# Patient Record
Sex: MALE | Race: WHITE | NOT HISPANIC OR LATINO
[De-identification: names, ages, dates, MRNs, and addresses within clinical notes are randomized per-mention and may not be internally consistent; named-entity substitution may affect disease eponyms.]

---

## 2018-12-10 PROBLEM — Z00.00 ENCOUNTER FOR PREVENTIVE HEALTH EXAMINATION: Status: ACTIVE | Noted: 2018-12-10

## 2019-01-10 ENCOUNTER — APPOINTMENT (OUTPATIENT)
Dept: OTOLARYNGOLOGY | Facility: CLINIC | Age: 74
End: 2019-01-10
Payer: MEDICARE

## 2019-01-10 VITALS
SYSTOLIC BLOOD PRESSURE: 165 MMHG | DIASTOLIC BLOOD PRESSURE: 87 MMHG | HEART RATE: 52 BPM | BODY MASS INDEX: 36.96 KG/M2 | WEIGHT: 264 LBS | HEIGHT: 71 IN

## 2019-01-10 DIAGNOSIS — Z78.9 OTHER SPECIFIED HEALTH STATUS: ICD-10-CM

## 2019-01-10 DIAGNOSIS — H91.93 UNSPECIFIED HEARING LOSS, BILATERAL: ICD-10-CM

## 2019-01-10 DIAGNOSIS — Z82.2 FAMILY HISTORY OF DEAFNESS AND HEARING LOSS: ICD-10-CM

## 2019-01-10 DIAGNOSIS — H90.42 SENSORINEURAL HEARING LOSS, UNILATERAL, LEFT EAR, WITH UNRESTRICTED HEARING ON THE CONTRALATERAL SIDE: ICD-10-CM

## 2019-01-10 DIAGNOSIS — Z86.39 PERSONAL HISTORY OF OTHER ENDOCRINE, NUTRITIONAL AND METABOLIC DISEASE: ICD-10-CM

## 2019-01-10 DIAGNOSIS — Z80.9 FAMILY HISTORY OF MALIGNANT NEOPLASM, UNSPECIFIED: ICD-10-CM

## 2019-01-10 DIAGNOSIS — Z87.891 PERSONAL HISTORY OF NICOTINE DEPENDENCE: ICD-10-CM

## 2019-01-10 DIAGNOSIS — Z86.79 PERSONAL HISTORY OF OTHER DISEASES OF THE CIRCULATORY SYSTEM: ICD-10-CM

## 2019-01-10 DIAGNOSIS — Z82.49 FAMILY HISTORY OF ISCHEMIC HEART DISEASE AND OTHER DISEASES OF THE CIRCULATORY SYSTEM: ICD-10-CM

## 2019-01-10 DIAGNOSIS — G60.9 HEREDITARY AND IDIOPATHIC NEUROPATHY, UNSPECIFIED: ICD-10-CM

## 2019-01-10 PROCEDURE — 92557 COMPREHENSIVE HEARING TEST: CPT

## 2019-01-10 PROCEDURE — 99204 OFFICE O/P NEW MOD 45 MIN: CPT | Mod: 25

## 2019-01-10 PROCEDURE — 92567 TYMPANOMETRY: CPT

## 2019-01-10 RX ORDER — PANTOPRAZOLE 40 MG/1
40 TABLET, DELAYED RELEASE ORAL
Refills: 0 | Status: ACTIVE | COMMUNITY

## 2019-01-10 RX ORDER — FLUOXETINE HYDROCHLORIDE 10 MG/1
10 TABLET ORAL
Refills: 0 | Status: ACTIVE | COMMUNITY

## 2019-01-10 RX ORDER — TRAZODONE HYDROCHLORIDE 300 MG/1
TABLET ORAL
Refills: 0 | Status: ACTIVE | COMMUNITY

## 2019-01-10 RX ORDER — FUROSEMIDE 40 MG/1
40 TABLET ORAL
Refills: 0 | Status: ACTIVE | COMMUNITY

## 2019-01-10 NOTE — REASON FOR VISIT
[Initial Evaluation] : an initial evaluation for [Spouse] : spouse [FreeTextEntry2] : here for bilateral hearing loss

## 2019-01-10 NOTE — REVIEW OF SYSTEMS
[Sneezing] : sneezing [Seasonal Allergies] : seasonal allergies [Ear Pain] : ear pain [Ear Itch] : ear itch [Hearing Loss] : hearing loss [Recurrent Ear Infections] : recurrent ear infections [Ear Drainage] : ear drainage [Ear Noises] : ear noises [Nasal Congestion] : nasal congestion [Sinus Pain] : sinus pain [Sinus Pressure] : sinus pressure [Snoring With Pauses] : snoring with pauses [Throat Clearing] : throat clearing [Dry Eyes] : dry eyes [Eyes Itch] : itching of the eyes [Shortness Of Breath] : shortness of breath [Cough] : cough [Negative] : Heme/Lymph

## 2019-01-10 NOTE — PHYSICAL EXAM
[Midline] : trachea located in midline position [Normal] : no rashes [de-identified] : wax removed AS - TMs normal

## 2019-01-10 NOTE — HISTORY OF PRESENT ILLNESS
[de-identified] : 73 year old male here for bilateral hearing loss.  States wearing bilateral hearing aids for about 20 years.  States ear infections, more often on the left than the right, usually 5-6 per year - most recently several months ago.  Treated usually with oral antibiotics, sometimes with ear drop as as well.  States intermittent tinnitus, usually the right ear.   Patient denies otalgia, otorrhea, dizziness, vertigo, headaches related to hearing.  Sees Dr. Avni Paz.  Mother also had hearing loss.  Left ear worse. \par

## 2019-02-13 ENCOUNTER — APPOINTMENT (OUTPATIENT)
Dept: SPEECH THERAPY | Facility: CLINIC | Age: 74
End: 2019-02-13

## 2019-02-13 ENCOUNTER — OUTPATIENT (OUTPATIENT)
Dept: OUTPATIENT SERVICES | Facility: HOSPITAL | Age: 74
LOS: 1 days | Discharge: HOME | End: 2019-02-13

## 2019-02-15 DIAGNOSIS — H90.5 UNSPECIFIED SENSORINEURAL HEARING LOSS: ICD-10-CM

## 2023-03-28 ENCOUNTER — APPOINTMENT (OUTPATIENT)
Dept: ORTHOPEDIC SURGERY | Facility: CLINIC | Age: 78
End: 2023-03-28
Payer: MEDICARE

## 2023-03-28 VITALS — WEIGHT: 264 LBS | HEIGHT: 71 IN | BODY MASS INDEX: 36.96 KG/M2

## 2023-03-28 DIAGNOSIS — M54.2 CERVICALGIA: ICD-10-CM

## 2023-03-28 PROCEDURE — 99204 OFFICE O/P NEW MOD 45 MIN: CPT

## 2023-03-28 NOTE — IMAGING
[de-identified] : Cervical Spine:\par \par Inspection/Palpation\par No tenderness to palpation throughout Cervical. \par No bony step offs, No lesions.\par \par Gait:\par Non-antalgic, able to perform bilateral toe and heel rise. \par Able to perform tandem gait. \par \par Range of Motion: \par Flexion to chin to chest, extension to 70 degrees, rotation 90 degrees bilaterally, Lateral flexion to 45 degrees bilaterally\par \par Neurologic:\par Bilateral upper extremities 5/5 Deltoid/Biceps/Triceps/ Wrist Flexion/Wrist Extension/ / Intrinsics \par \par Sensation intact to light touch C5-T1\par \par Biceps/Triceps/Brachioradialis Reflex within normal limits\par \par Negative Moraeu's, No inverted brachioradials reflex\par \par \par X-ray Ap/Lateral of cervical spine were viewed and interpreted from [facility] .  loss of lorodiss. Multilevel degenerative cahnges.  No spondylolisthesis.

## 2023-03-28 NOTE — HISTORY OF PRESENT ILLNESS
[de-identified] : The patient is a 77 year old male who presents today complaining of neck pain.  \par Date of Injury/Onset: 5-10+ years\par Pain:    At Rest: 5/10 \par With Activity:  10/10 \par Mechanism of injury: Insidious\par Quality of symptoms: Tightness/ Aching \par Improves with: Meds\par Worse with: _\par Prior treatment/ Imaging: PCP prescribed steroids, muscle relaxers/ XR @ [facility]\par Occupation: Retired\par Additional Information: None\par \par 78 y/o male here for evaluation cervical spine. 4 weeks ago patient had stiffness and left head pain. No injury. Saw his PMD who placed ordered an xray and placed him on Medrol, muscle relaxers and NSAIDs. He symptoms have fully resolved. No PT, chiro, acupuncture, injection yet. No c/o numbness or paraesthesias. No bowel or bladder incontinence. No gait disturbance.\par \par History of cervical spine pain 15 years ago, treated with PT and fully resolved. \par \par

## 2023-03-28 NOTE — ASSESSMENT
[FreeTextEntry1] : 77 M with axial neck pain and muscle spasm that is resolving. \par PT\par nsaids OTC PRN\par FU PRN

## 2024-01-03 ENCOUNTER — APPOINTMENT (OUTPATIENT)
Dept: ORTHOPEDIC SURGERY | Facility: CLINIC | Age: 79
End: 2024-01-03
Payer: MEDICARE

## 2024-01-03 DIAGNOSIS — R29.898 OTHER SYMPTOMS AND SIGNS INVOLVING THE MUSCULOSKELETAL SYSTEM: ICD-10-CM

## 2024-01-03 DIAGNOSIS — M76.61 ACHILLES TENDINITIS, RIGHT LEG: ICD-10-CM

## 2024-01-03 DIAGNOSIS — G60.0 HEREDITARY MOTOR AND SENSORY NEUROPATHY: ICD-10-CM

## 2024-01-03 PROCEDURE — 73600 X-RAY EXAM OF ANKLE: CPT | Mod: RT

## 2024-01-03 PROCEDURE — 73630 X-RAY EXAM OF FOOT: CPT | Mod: RT

## 2024-01-03 PROCEDURE — 99214 OFFICE O/P EST MOD 30 MIN: CPT

## 2024-01-03 NOTE — HISTORY OF PRESENT ILLNESS
[6] : 6 [3] : 3 [Sharp] : sharp [de-identified] : Pt is a 78 year old male who presents today for evaluation of his right ankle. Pt states that his posterior ankle since November 2023. Was seeing a podiatrist, gave him CSI mid of November. Helped a little bit then it flared up again, received another CSI beginning of December 2023. Pain localized along the posterior ankle.  No other treatment to date aside from icing.  Denies trauma/previous injury. A little numbness/tingling. Hx of Charcot-Pauly-Tooth disease.  Ice to affected area. He has been taking meloxicam, but does not feel it is helping. No formal treatment to date.  WB in sneakers.  [] : no [FreeTextEntry1] : Right ankle

## 2024-01-03 NOTE — PHYSICAL EXAM
[NL (40)] : plantar flexion 40 degrees [NL 30)] : inversion 30 degrees [NL (20)] : eversion 20 degrees [4___] : eversion 4[unfilled]/5 [2+] : posterior tibialis pulse: 2+ [] : non-antalgic [Right] : right foot [Weight -] : weightbearing [There are no fractures, subluxations or dislocations. No significant abnormalities are seen] : There are no fractures, subluxations or dislocations. No significant abnormalities are seen [FreeTextEntry8] : Thickening in the achilles tendon watershed area. [de-identified] : Diffused decreased sensation in the forefoot. [de-identified] : Achilles insertional spur [TWNoteComboBox7] : dorsiflexion 10 degrees

## 2024-01-03 NOTE — DISCUSSION/SUMMARY
[de-identified] : Patient will start PT. He will continue with his custom orthotics and the meloxicam. Icing daily.  The patient was explained the options as well as benefits of over the counter verses prescription strength nonsteroidal anti-inflammatory medication. The patient opts for a prescription strength medication.

## 2024-01-23 ENCOUNTER — OFFICE (OUTPATIENT)
Dept: URBAN - METROPOLITAN AREA CLINIC 12 | Facility: CLINIC | Age: 79
Setting detail: OPHTHALMOLOGY
End: 2024-01-23
Payer: COMMERCIAL

## 2024-01-23 DIAGNOSIS — H35.033: ICD-10-CM

## 2024-01-23 DIAGNOSIS — H25.13: ICD-10-CM

## 2024-01-23 DIAGNOSIS — H02.403: ICD-10-CM

## 2024-01-23 DIAGNOSIS — H43.393: ICD-10-CM

## 2024-01-23 DIAGNOSIS — H47.012: ICD-10-CM

## 2024-01-23 PROBLEM — H02.821 LID LESION; RIGHT UPPER LID: Status: ACTIVE | Noted: 2024-01-23

## 2024-01-23 PROCEDURE — 92133 CPTRZD OPH DX IMG PST SGM ON: CPT | Performed by: OPHTHALMOLOGY

## 2024-01-23 PROCEDURE — 99214 OFFICE O/P EST MOD 30 MIN: CPT | Performed by: OPHTHALMOLOGY

## 2024-01-23 ASSESSMENT — REFRACTION_CURRENTRX
OD_AXIS: 092
OS_VPRISM_DIRECTION: PROGS
OS_SPHERE: +2.75
OS_ADD: +2.25
OS_CYLINDER: -0.50
OS_SPHERE: +2.50
OD_SPHERE: +2.75
OD_CYLINDER: -0.50
OD_ADD: +2.25
OD_ADD: +2.50
OS_AXIS: 073
OD_VPRISM_DIRECTION: PROGS
OD_CYLINDER: -0.25
OD_AXIS: 089
OD_SPHERE: +2.50
OS_CYLINDER: -0.75
OS_ADD: +2.50
OS_AXIS: 067
OD_OVR_VA: 20/
OS_VPRISM_DIRECTION: PROGS
OD_OVR_VA: 20/
OS_OVR_VA: 20/
OD_VPRISM_DIRECTION: PROGS
OS_OVR_VA: 20/

## 2024-01-23 ASSESSMENT — LID POSITION - PTOSIS
OD_PTOSIS: RUL 2+ 3+
OS_PTOSIS: LUL 2+ 3+

## 2024-01-23 ASSESSMENT — REFRACTION_AUTOREFRACTION
OD_CYLINDER: -1.00
OS_CYLINDER: -1.25
OD_SPHERE: +2.75
OS_SPHERE: +2.75
OS_AXIS: 071
OD_AXIS: 102

## 2024-01-23 ASSESSMENT — REFRACTION_MANIFEST
OD_AXIS: 100
OS_AXIS: 080
OD_SPHERE: +2.25
OD_SPHERE: +2.75
OS_VA1: 20/20
OS_ADD: +2.50
OS_CYLINDER: -1.00
OD_ADD: +2.50
OD_CYLINDER: -0.75
OS_SPHERE: +2.75
OS_SPHERE: +2.75
OD_CYLINDER: -0.25
OS_ADD: +2.50
OS_AXIS: 070
OS_CYLINDER: -1.00
OD_VA1: 20/25+2
OD_AXIS: 098
OD_ADD: +2.50

## 2024-01-23 ASSESSMENT — CONFRONTATIONAL VISUAL FIELD TEST (CVF)
OS_FINDINGS: FULL
OD_FINDINGS: FULL

## 2024-01-23 ASSESSMENT — SPHEQUIV_DERIVED
OS_SPHEQUIV: 2.125
OS_SPHEQUIV: 2.25
OD_SPHEQUIV: 2.25
OS_SPHEQUIV: 2.25
OD_SPHEQUIV: 2.375
OD_SPHEQUIV: 2.125

## 2024-02-12 ENCOUNTER — RX ONLY (RX ONLY)
Age: 79
End: 2024-02-12

## 2024-02-12 ENCOUNTER — OFFICE (OUTPATIENT)
Facility: LOCATION | Age: 79
Setting detail: OPHTHALMOLOGY
End: 2024-02-12
Payer: COMMERCIAL

## 2024-02-12 DIAGNOSIS — H02.821: ICD-10-CM

## 2024-02-12 PROBLEM — H02.40 PTOSIS OF EYELID, UNSPECIFIED; BOTH EYES: Status: ACTIVE | Noted: 2024-02-12

## 2024-02-12 PROCEDURE — 92285 EXTERNAL OCULAR PHOTOGRAPHY: CPT | Performed by: OPHTHALMOLOGY

## 2024-02-12 PROCEDURE — 67840 REMOVE EYELID LESION: CPT | Mod: E3 | Performed by: OPHTHALMOLOGY

## 2024-02-12 ASSESSMENT — SPHEQUIV_DERIVED
OS_SPHEQUIV: 2.125
OD_SPHEQUIV: 2.25

## 2024-02-12 ASSESSMENT — REFRACTION_AUTOREFRACTION
OS_CYLINDER: -1.25
OD_SPHERE: +2.75
OS_SPHERE: +2.75
OS_AXIS: 071
OD_AXIS: 102
OD_CYLINDER: -1.00

## 2024-02-12 ASSESSMENT — LID POSITION - PTOSIS
OS_PTOSIS: LUL 2+ 3+
OD_PTOSIS: RUL 2+ 3+

## 2024-02-12 ASSESSMENT — CONFRONTATIONAL VISUAL FIELD TEST (CVF)
OD_FINDINGS: FULL
OS_FINDINGS: FULL

## 2024-02-21 ENCOUNTER — APPOINTMENT (OUTPATIENT)
Dept: ORTHOPEDIC SURGERY | Facility: CLINIC | Age: 79
End: 2024-02-21
Payer: MEDICARE

## 2024-02-21 ENCOUNTER — APPOINTMENT (OUTPATIENT)
Dept: MRI IMAGING | Facility: CLINIC | Age: 79
End: 2024-02-21
Payer: MEDICARE

## 2024-02-21 DIAGNOSIS — S86.011A STRAIN OF RIGHT ACHILLES TENDON, INITIAL ENCOUNTER: ICD-10-CM

## 2024-02-21 PROCEDURE — 73721 MRI JNT OF LWR EXTRE W/O DYE: CPT | Mod: RT

## 2024-02-21 PROCEDURE — L4361: CPT | Mod: RT

## 2024-02-21 PROCEDURE — 99214 OFFICE O/P EST MOD 30 MIN: CPT

## 2024-02-21 NOTE — HISTORY OF PRESENT ILLNESS
[6] : 6 [3] : 3 [Sharp] : sharp [de-identified] : Pt. is a 78 year old male who presents today for follow up of his RT ankle. Was seeing a podiatrist, gave him CSI mid November 2023. Helped a little bit then it flared up again, received another CSI beginning of December 2023. Pain localized along the posterior ankle.  No other treatment to date aside from icing. History of Charcot-Pauly-Tooth disease. Ice to affected area. He has been taking meloxicam, but does not feel it is helping. WB in sneakers. He reports feeling a pop and developing worsening pain 2/17/24.  [] : no [FreeTextEntry1] : Right ankle

## 2024-02-21 NOTE — PHYSICAL EXAM
[NL (40)] : plantar flexion 40 degrees [NL 30)] : inversion 30 degrees [NL (20)] : eversion 20 degrees [4___] : eversion 4[unfilled]/5 [2+] : posterior tibialis pulse: 2+ [Right] : right foot [Weight -] : weightbearing [There are no fractures, subluxations or dislocations. No significant abnormalities are seen] : There are no fractures, subluxations or dislocations. No significant abnormalities are seen [de-identified] : Achilles insertional spur [Moderate] : moderate diffused ankle swelling [3___] : plantar flexion 3[unfilled]/5 [] : antalgic [FreeTextEntry8] : Palpable gap Achilles tendon [de-identified] : Diffused decreased sensation in the forefoot. [TWNoteComboBox7] : dorsiflexion 10 degrees

## 2024-02-21 NOTE — DISCUSSION/SUMMARY
[de-identified] : The patient was explained that they have a rupture of the Achilles tendon. An MRI of the ankle was ordered to confirm the diagnosis and rule out any other pathology. Both nonoperative and operative treatment options and associated risks and benefits were discussed with the patient. The patient was explained that both options can be successful.  Nonoperative treatment can take longer to return to full activities, and can be associated with a higher re-rupture rate. The most common complication with surgery is with the incision site having some sort of breakdown and/or infection. The patient has opted for surgical intervention.  NWB in CAM boot with heel lifts is recommended.   Patient was instructed that they can not operate an automatic vehicle while wearing a CAM boot or cast on the right lower extremity. If operating a vehicle that requires use of a clutch, patient may not drive while wearing a CAM boot or cast on the left lower extremity.

## 2024-02-23 ENCOUNTER — NON-APPOINTMENT (OUTPATIENT)
Age: 79
End: 2024-02-23

## 2024-03-13 ENCOUNTER — OFFICE (OUTPATIENT)
Dept: URBAN - METROPOLITAN AREA CLINIC 111 | Facility: CLINIC | Age: 79
Setting detail: OPHTHALMOLOGY
End: 2024-03-13
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
## 2024-03-13 PROCEDURE — NO SHOW FE NO SHOW FEE: Performed by: OPHTHALMOLOGY

## 2024-05-01 ENCOUNTER — APPOINTMENT (OUTPATIENT)
Dept: ORTHOPEDIC SURGERY | Facility: CLINIC | Age: 79
End: 2024-05-01

## 2024-05-20 ENCOUNTER — NON-APPOINTMENT (OUTPATIENT)
Age: 79
End: 2024-05-20

## 2024-05-20 DIAGNOSIS — L85.3 XEROSIS CUTIS: ICD-10-CM

## 2024-05-20 DIAGNOSIS — Q66.6 OTHER CONGENITAL VALGUS DEFORMITIES OF FEET: ICD-10-CM

## 2024-05-20 DIAGNOSIS — L60.0 INGROWING NAIL: ICD-10-CM

## 2024-05-20 DIAGNOSIS — B35.1 TINEA UNGUIUM: ICD-10-CM

## 2024-05-20 RX ORDER — METOPROLOL TARTRATE 25 MG/1
25 TABLET, FILM COATED ORAL
Refills: 0 | Status: ACTIVE | COMMUNITY

## 2024-05-20 RX ORDER — L-METHYLFOLATE-ALGAE-VIT B12-B6 CAP 3-90.314-2-35 MG 3-90.314-2-35 MG
3-90.314-2-35 CAP ORAL
Refills: 0 | Status: ACTIVE | COMMUNITY

## 2024-05-20 RX ORDER — LEVOMEFOLATE/B6/B12/ALGAL OIL 3-35-2 MG
3-90.314-2-35 CAPSULE ORAL
Refills: 0 | Status: ACTIVE | COMMUNITY

## 2024-05-20 RX ORDER — BUSPIRONE HYDROCHLORIDE 10 MG/1
10 TABLET ORAL
Refills: 0 | Status: ACTIVE | COMMUNITY

## 2024-05-20 RX ORDER — ENALAPRIL MALEATE 10 MG/1
10 TABLET ORAL
Refills: 0 | Status: ACTIVE | COMMUNITY

## 2024-05-20 RX ORDER — ATORVASTATIN CALCIUM 40 MG/1
40 TABLET, FILM COATED ORAL
Refills: 0 | Status: ACTIVE | COMMUNITY

## 2024-05-20 RX ORDER — MELOXICAM 15 MG/1
15 TABLET ORAL
Refills: 0 | Status: ACTIVE | COMMUNITY

## 2024-05-21 ENCOUNTER — APPOINTMENT (OUTPATIENT)
Dept: PODIATRY | Facility: CLINIC | Age: 79
End: 2024-05-21
Payer: MEDICARE

## 2024-05-21 PROCEDURE — 11721 DEBRIDE NAIL 6 OR MORE: CPT

## 2024-05-25 NOTE — HISTORY OF PRESENT ILLNESS
[FreeTextEntry1] : The patient returns to the office with a chief complaint of tenderness and pain on the toenails of toes 1, 2 3, 4 and 5 left foot and 1, 2, 3, 4 and 5 right foot.  The patient states that when he walks the pain gets worse.  Certain shoes are more bothersome than others. An updated medical history did not reveal any changes.

## 2024-05-25 NOTE — PHYSICAL EXAM
[TextEntry] : On toes 1, 2 3, 4 and 5 left foot and 1, 2, 3, 4 and 5 right foot the toenails exhibited a yellowish discoloration with thickening.  Upon palpation of the toenails increased pain was elicited. The vascular, orthopedic and dermatological status of each foot was otherwise unchanged.

## 2024-05-25 NOTE — ASSESSMENT
[FreeTextEntry1] : Assessment: Onychomycosis caused by T. Rubrum.  Plan: The toenails 1 - 5 on both feet were debrided mechanically and then were electrically burred to a more normal appearance to reduce the fungal load and allow the antifungal medication to work more effectively.   All toenails were trimmed with a 14 cm sterile stainless steel box lock double spring nail splitter.  Then utilizing a sterile pear shaped ezequiel ritu (this device falls under bur, surgical, general & plastic surgery.  The FDA deems this item a Class-1 device) via a 35,000 RPM electric drill and vacuum and dust extractor system all toenails were aseptically debrided removing fungal layers.  This is done to diminish the fungal load of the toenails and enhance the effects of the antifungal medication, allowing overall improvement in the degree of fungal infection as well as improve appearance and reduce discomfort and help diminish chances of secondary bacterial infection, also lessening the chance of ingrown nails, especially when performed on a regular basis.  The patient was encouraged to continue with the topical antifungal medication everyday and use the Clean Sweep antifungal spray to disinfect their shoes. He was advised to call the office at any time with any problems or questions.  PTR: 2 months.

## 2024-07-02 ENCOUNTER — APPOINTMENT (OUTPATIENT)
Dept: PODIATRY | Facility: CLINIC | Age: 79
End: 2024-07-02

## 2024-07-02 DIAGNOSIS — L08.9 LOCAL INFECTION OF THE SKIN AND SUBCUTANEOUS TISSUE, UNSPECIFIED: ICD-10-CM

## 2024-07-02 PROCEDURE — 10060 I&D ABSCESS SIMPLE/SINGLE: CPT

## 2024-07-02 RX ORDER — MUPIROCIN 20 MG/G
2 OINTMENT TOPICAL
Qty: 22 | Refills: 2 | Status: ACTIVE | COMMUNITY
Start: 2024-07-02 | End: 1900-01-01

## 2024-07-23 ENCOUNTER — APPOINTMENT (OUTPATIENT)
Dept: PODIATRY | Facility: CLINIC | Age: 79
End: 2024-07-23

## 2024-07-30 ENCOUNTER — APPOINTMENT (OUTPATIENT)
Dept: PODIATRY | Facility: CLINIC | Age: 79
End: 2024-07-30
Payer: MEDICARE

## 2024-07-30 PROCEDURE — 11721 DEBRIDE NAIL 6 OR MORE: CPT

## 2024-08-01 NOTE — ASSESSMENT
[FreeTextEntry1] : Assessment: Onychomycosis caused by T. Rubrum.  Plan: I debrided each toenail via mechanical trimming and electrical grinding.  All toenails were trimmed with a 14 cm sterile stainless steel box lock double spring nail splitter.  Then utilizing a sterile pear shaped ezequiel ritu (this device falls under bur, surgical, general & plastic surgery.  The FDA deems this item a Class-1 device) via a 35,000 RPM electric drill and vacuum and dust extractor system all toenails were aseptically debrided removing fungal layers.  This is done to diminish the fungal load of the toenails and enhance the effects of the antifungal medication, allowing overall improvement in the degree of fungal infection as well as improve appearance and reduce discomfort and help diminish chances of secondary bacterial infection, also lessening the chance of ingrown nails, especially when performed on a regular basis.  I instructed the patient to continue with the antifungal therapy provided everyday and use the Clean Sweep antifungal spray to disinfect their shoes, as continued improvement was noted.  He was encouraged to call the office with any questions or problems as they may arise.  PTR: 2 months.

## 2024-08-15 ENCOUNTER — OFFICE (OUTPATIENT)
Dept: URBAN - METROPOLITAN AREA CLINIC 12 | Facility: CLINIC | Age: 79
Setting detail: OPHTHALMOLOGY
End: 2024-08-15
Payer: COMMERCIAL

## 2024-08-15 DIAGNOSIS — H25.13: ICD-10-CM

## 2024-08-15 DIAGNOSIS — H47.012: ICD-10-CM

## 2024-08-15 DIAGNOSIS — H02.403: ICD-10-CM

## 2024-08-15 DIAGNOSIS — H43.393: ICD-10-CM

## 2024-08-15 DIAGNOSIS — H35.033: ICD-10-CM

## 2024-08-15 PROCEDURE — 92014 COMPRE OPH EXAM EST PT 1/>: CPT | Performed by: OPHTHALMOLOGY

## 2024-08-15 ASSESSMENT — LID POSITION - PTOSIS
OS_PTOSIS: LUL 2+ 3+
OD_PTOSIS: RUL 2+ 3+

## 2024-08-15 ASSESSMENT — CONFRONTATIONAL VISUAL FIELD TEST (CVF)
OD_FINDINGS: FULL
OS_FINDINGS: FULL

## 2024-10-01 ENCOUNTER — APPOINTMENT (OUTPATIENT)
Dept: PODIATRY | Facility: CLINIC | Age: 79
End: 2024-10-01
Payer: MEDICARE

## 2024-10-01 PROCEDURE — 11721 DEBRIDE NAIL 6 OR MORE: CPT

## 2024-10-03 NOTE — ASSESSMENT
[FreeTextEntry1] : Assessment: Onychomycosis caused by T. Rubrum.  Plan: Aseptic debridement was performed on all toenails utilizing electric burring and mechanical debridement.  All toenails were trimmed with a 14 cm sterile stainless steel box lock double spring nail splitter.  Then utilizing a sterile pear shaped ezequiel ritu (this device falls under bur, surgical, general & plastic surgery.  The FDA deems this item a Class-1 device) via a 35,000 RPM electric drill and vacuum and dust extractor system all toenails were aseptically debrided removing fungal layers.  This is done to diminish the fungal load of the toenails and enhance the effects of the antifungal medication, allowing overall improvement in the degree of fungal infection as well as improve appearance and reduce discomfort and help diminish chances of secondary bacterial infection, also lessening the chance of ingrown nails, especially when performed on a regular basis.  The patient was encouraged to continue with the topical antifungal medication everyday and use the Clean Sweep antifungal spray to disinfect their shoes.  PTR: 2 months.

## 2024-10-08 ENCOUNTER — APPOINTMENT (OUTPATIENT)
Dept: ENDOCRINOLOGY | Facility: CLINIC | Age: 79
End: 2024-10-08

## 2024-10-15 ENCOUNTER — APPOINTMENT (OUTPATIENT)
Dept: PODIATRY | Facility: CLINIC | Age: 79
End: 2024-10-15

## 2024-12-12 ENCOUNTER — APPOINTMENT (OUTPATIENT)
Dept: PODIATRY | Facility: CLINIC | Age: 79
End: 2024-12-12
Payer: MEDICARE

## 2024-12-12 PROCEDURE — 11721 DEBRIDE NAIL 6 OR MORE: CPT

## 2024-12-19 ENCOUNTER — APPOINTMENT (OUTPATIENT)
Dept: PODIATRY | Facility: CLINIC | Age: 79
End: 2024-12-19

## 2025-03-25 ENCOUNTER — APPOINTMENT (OUTPATIENT)
Dept: PODIATRY | Facility: CLINIC | Age: 80
End: 2025-03-25
Payer: MEDICARE

## 2025-03-25 PROCEDURE — 11721 DEBRIDE NAIL 6 OR MORE: CPT | Mod: 59

## 2025-05-01 ENCOUNTER — OFFICE (OUTPATIENT)
Dept: URBAN - METROPOLITAN AREA CLINIC 12 | Facility: CLINIC | Age: 80
Setting detail: OPHTHALMOLOGY
End: 2025-05-01
Payer: COMMERCIAL

## 2025-05-01 DIAGNOSIS — H43.393: ICD-10-CM

## 2025-05-01 DIAGNOSIS — H47.012: ICD-10-CM

## 2025-05-01 DIAGNOSIS — H25.13: ICD-10-CM

## 2025-05-01 DIAGNOSIS — H35.033: ICD-10-CM

## 2025-05-01 DIAGNOSIS — H02.403: ICD-10-CM

## 2025-05-01 PROCEDURE — 92250 FUNDUS PHOTOGRAPHY W/I&R: CPT | Performed by: OPHTHALMOLOGY

## 2025-05-01 PROCEDURE — 99214 OFFICE O/P EST MOD 30 MIN: CPT | Performed by: OPHTHALMOLOGY

## 2025-05-01 ASSESSMENT — REFRACTION_AUTOREFRACTION
OS_SPHERE: +3.25
OS_CYLINDER: -1.50
OS_AXIS: 077
OD_CYLINDER: -1.25
OD_SPHERE: +3.50
OD_AXIS: 100

## 2025-05-01 ASSESSMENT — REFRACTION_CURRENTRX
OS_AXIS: 074
OS_CYLINDER: -0.50
OD_CYLINDER: -0.25
OD_OVR_VA: 20/
OS_ADD: +2.25
OD_AXIS: 111
OD_VPRISM_DIRECTION: PROGS
OS_VPRISM_DIRECTION: PROGS
OS_SPHERE: +2.50
OD_ADD: +2.25
OS_OVR_VA: 20/
OD_SPHERE: +2.50

## 2025-05-01 ASSESSMENT — KERATOMETRY
OS_K2POWER_DIOPTERS: 43.50
OD_K2POWER_DIOPTERS: 43.50
OS_AXISANGLE_DEGREES: 001
OD_K1POWER_DIOPTERS: 43.00
OD_AXISANGLE_DEGREES: 006
METHOD_AUTO_MANUAL: AUTO
OS_K1POWER_DIOPTERS: 42.75

## 2025-05-01 ASSESSMENT — CONFRONTATIONAL VISUAL FIELD TEST (CVF)
OS_FINDINGS: FULL
OD_FINDINGS: FULL

## 2025-05-01 ASSESSMENT — TONOMETRY: OD_IOP_MMHG: 12

## 2025-05-01 ASSESSMENT — LID POSITION - PTOSIS
OD_PTOSIS: RUL 2+ 3+
OS_PTOSIS: LUL 2+ 3+

## 2025-05-01 ASSESSMENT — VISUAL ACUITY
OS_BCVA: 20/40
OD_BCVA: 20/30

## 2025-06-03 ENCOUNTER — OFFICE (OUTPATIENT)
Dept: URBAN - METROPOLITAN AREA CLINIC 12 | Facility: CLINIC | Age: 80
Setting detail: OPHTHALMOLOGY
End: 2025-06-03
Payer: COMMERCIAL

## 2025-06-03 ENCOUNTER — APPOINTMENT (OUTPATIENT)
Dept: PODIATRY | Facility: CLINIC | Age: 80
End: 2025-06-03
Payer: MEDICARE

## 2025-06-03 DIAGNOSIS — H25.11: ICD-10-CM

## 2025-06-03 DIAGNOSIS — H25.13: ICD-10-CM

## 2025-06-03 PROCEDURE — 99213 OFFICE O/P EST LOW 20 MIN: CPT | Performed by: OPHTHALMOLOGY

## 2025-06-03 PROCEDURE — 92136 OPHTHALMIC BIOMETRY: CPT | Mod: RT | Performed by: OPHTHALMOLOGY

## 2025-06-03 PROCEDURE — 11721 DEBRIDE NAIL 6 OR MORE: CPT

## 2025-06-03 ASSESSMENT — REFRACTION_AUTOREFRACTION
OS_AXIS: 082
OS_SPHERE: +3.25
OD_AXIS: 103
OD_CYLINDER: -1.25
OD_SPHERE: +3.50
OS_CYLINDER: -1.50

## 2025-06-03 ASSESSMENT — CONFRONTATIONAL VISUAL FIELD TEST (CVF)
OS_FINDINGS: FULL
OD_FINDINGS: FULL

## 2025-06-03 ASSESSMENT — KERATOMETRY
OD_K1POWER_DIOPTERS: 42.75
OD_AXISANGLE_DEGREES: 019
METHOD_AUTO_MANUAL: AUTO
OS_K1POWER_DIOPTERS: 42.50
OS_K2POWER_DIOPTERS: 43.50
OS_AXISANGLE_DEGREES: 002
OD_K2POWER_DIOPTERS: 43.50

## 2025-06-03 ASSESSMENT — REFRACTION_CURRENTRX
OS_ADD: +2.25
OD_SPHERE: +2.50
OD_VPRISM_DIRECTION: PROGS
OS_CYLINDER: -0.50
OD_AXIS: 111
OS_AXIS: 074
OS_SPHERE: +2.50
OD_CYLINDER: -0.25
OS_VPRISM_DIRECTION: PROGS
OS_OVR_VA: 20/
OD_OVR_VA: 20/
OD_ADD: +2.25

## 2025-06-03 ASSESSMENT — VISUAL ACUITY
OS_BCVA: 20/60
OD_BCVA: 20/30+3

## 2025-06-03 ASSESSMENT — LID POSITION - PTOSIS
OS_PTOSIS: LUL 2+ 3+
OD_PTOSIS: RUL 2+ 3+

## 2025-06-08 ENCOUNTER — OFFICE (OUTPATIENT)
Dept: URBAN - METROPOLITAN AREA CLINIC 12 | Facility: CLINIC | Age: 80
Setting detail: OPHTHALMOLOGY
End: 2025-06-08
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
## 2025-06-08 PROCEDURE — NO SHOW FE NO SHOW FEE: Performed by: OPHTHALMOLOGY

## 2025-06-16 ENCOUNTER — ASC (OUTPATIENT)
Dept: URBAN - METROPOLITAN AREA SURGERY 8 | Facility: SURGERY | Age: 80
Setting detail: OPHTHALMOLOGY
End: 2025-06-16
Payer: COMMERCIAL

## 2025-06-16 DIAGNOSIS — H52.221: ICD-10-CM

## 2025-06-16 DIAGNOSIS — H25.11: ICD-10-CM

## 2025-06-16 PROCEDURE — FEMTO PRECISION LASER CATARACT SURGERY: Mod: GY | Performed by: OPHTHALMOLOGY

## 2025-06-16 PROCEDURE — 66984 XCAPSL CTRC RMVL W/O ECP: CPT | Mod: RT | Performed by: OPHTHALMOLOGY

## 2025-06-17 ENCOUNTER — RX ONLY (RX ONLY)
Age: 80
End: 2025-06-17

## 2025-06-17 ENCOUNTER — OFFICE (OUTPATIENT)
Dept: URBAN - METROPOLITAN AREA CLINIC 12 | Facility: CLINIC | Age: 80
Setting detail: OPHTHALMOLOGY
End: 2025-06-17
Payer: COMMERCIAL

## 2025-06-17 DIAGNOSIS — Z96.1: ICD-10-CM

## 2025-06-17 PROCEDURE — 99024 POSTOP FOLLOW-UP VISIT: CPT | Performed by: OPTOMETRIST

## 2025-06-17 ASSESSMENT — CONFRONTATIONAL VISUAL FIELD TEST (CVF)
OS_FINDINGS: FULL
OD_FINDINGS: FULL

## 2025-06-17 ASSESSMENT — KERATOMETRY
OS_K1POWER_DIOPTERS: 42.75
OS_K2POWER_DIOPTERS: 43.50
OS_AXISANGLE_DEGREES: 001
OD_K1POWER_DIOPTERS: 42.75
OD_AXISANGLE_DEGREES: 165
METHOD_AUTO_MANUAL: AUTO
OD_K2POWER_DIOPTERS: 43.25

## 2025-06-17 ASSESSMENT — REFRACTION_CURRENTRX
OD_ADD: +2.50
OS_AXIS: 074
OS_OVR_VA: 20/
OD_OVR_VA: 20/
OS_VPRISM_DIRECTION: PROGS
OS_SPHERE: +2.50
OS_CYLINDER: -0.50
OD_AXIS: 111
OD_SPHERE: +2.50
OS_ADD: +2.50
OD_VPRISM_DIRECTION: PROGS
OD_CYLINDER: -0.25

## 2025-06-17 ASSESSMENT — LID POSITION - PTOSIS
OS_PTOSIS: LUL 2+ 3+
OD_PTOSIS: RUL 2+ 3+

## 2025-06-17 ASSESSMENT — VISUAL ACUITY
OD_BCVA: 20/30+2
OS_BCVA: 20/30+3

## 2025-06-17 ASSESSMENT — REFRACTION_AUTOREFRACTION
OS_SPHERE: +3.00
OD_SPHERE: +0.75
OD_CYLINDER: -0.75
OD_AXIS: 110
OS_CYLINDER: -1.25
OS_AXIS: 081

## 2025-06-24 ENCOUNTER — OFFICE (OUTPATIENT)
Dept: URBAN - METROPOLITAN AREA CLINIC 12 | Facility: CLINIC | Age: 80
Setting detail: OPHTHALMOLOGY
End: 2025-06-24
Payer: COMMERCIAL

## 2025-06-24 DIAGNOSIS — H25.12: ICD-10-CM

## 2025-06-24 PROCEDURE — 92136 OPHTHALMIC BIOMETRY: CPT | Mod: LT | Performed by: OPHTHALMOLOGY

## 2025-06-24 ASSESSMENT — KERATOMETRY
OD_AXISANGLE_DEGREES: 111
OS_K2POWER_DIOPTERS: 43.75
OS_AXISANGLE_DEGREES: 173
OD_K2POWER_DIOPTERS: 43.25
METHOD_AUTO_MANUAL: AUTO
OS_K1POWER_DIOPTERS: 43.00
OD_K1POWER_DIOPTERS: 43.00

## 2025-06-24 ASSESSMENT — REFRACTION_CURRENTRX
OS_OVR_VA: 20/
OD_CYLINDER: -0.25
OD_OVR_VA: 20/
OS_AXIS: 074
OS_CYLINDER: -0.50
OD_AXIS: 111
OS_ADD: +2.50
OD_SPHERE: +2.50
OD_ADD: +2.50
OD_VPRISM_DIRECTION: PROGS
OS_SPHERE: +2.50
OS_VPRISM_DIRECTION: PROGS

## 2025-06-24 ASSESSMENT — VISUAL ACUITY
OD_BCVA: 20/30
OS_BCVA: 20/20-2

## 2025-06-24 ASSESSMENT — REFRACTION_AUTOREFRACTION
OD_AXIS: 1
OS_AXIS: 79
OD_CYLINDER: -0.25
OS_CYLINDER: -1.00
OD_SPHERE: +0.25
OS_SPHERE: +3.00

## 2025-06-24 ASSESSMENT — CONFRONTATIONAL VISUAL FIELD TEST (CVF)
OS_FINDINGS: FULL
OD_FINDINGS: FULL

## 2025-06-24 ASSESSMENT — LID POSITION - PTOSIS
OD_PTOSIS: RUL 2+ 3+
OS_PTOSIS: LUL 2+ 3+

## 2025-06-24 ASSESSMENT — TONOMETRY
OS_IOP_MMHG: 10
OD_IOP_MMHG: 10

## 2025-06-30 ENCOUNTER — ASC (OUTPATIENT)
Dept: URBAN - METROPOLITAN AREA SURGERY 8 | Facility: SURGERY | Age: 80
Setting detail: OPHTHALMOLOGY
End: 2025-06-30
Payer: COMMERCIAL

## 2025-06-30 DIAGNOSIS — H25.12: ICD-10-CM

## 2025-06-30 DIAGNOSIS — H52.222: ICD-10-CM

## 2025-06-30 PROCEDURE — 66984 XCAPSL CTRC RMVL W/O ECP: CPT | Mod: 79,LT | Performed by: OPHTHALMOLOGY

## 2025-06-30 PROCEDURE — FEMTO PRECISION LASER CATARACT SURGERY: Mod: GY | Performed by: OPHTHALMOLOGY

## 2025-07-01 ENCOUNTER — OFFICE (OUTPATIENT)
Dept: URBAN - METROPOLITAN AREA CLINIC 12 | Facility: CLINIC | Age: 80
Setting detail: OPHTHALMOLOGY
End: 2025-07-01
Payer: COMMERCIAL

## 2025-07-01 DIAGNOSIS — Z96.1: ICD-10-CM

## 2025-07-01 PROCEDURE — 99024 POSTOP FOLLOW-UP VISIT: CPT | Performed by: OPTOMETRIST

## 2025-07-01 ASSESSMENT — REFRACTION_AUTOREFRACTION
OD_SPHERE: +0.25
OD_CYLINDER: -0.25
OS_CYLINDER: -0.75
OS_SPHERE: +0.75
OD_AXIS: 171
OS_AXIS: 097

## 2025-07-01 ASSESSMENT — REFRACTION_CURRENTRX
OD_ADD: +2.50
OD_VPRISM_DIRECTION: PROGS
OD_AXIS: 111
OS_VPRISM_DIRECTION: PROGS
OS_SPHERE: +2.50
OS_AXIS: 074
OS_OVR_VA: 20/
OD_OVR_VA: 20/
OD_CYLINDER: -0.25
OS_CYLINDER: -0.50
OS_ADD: +2.50
OD_SPHERE: +2.50

## 2025-07-01 ASSESSMENT — KERATOMETRY
OS_AXISANGLE_DEGREES: 022
OD_AXISANGLE_DEGREES: 094
OS_K2POWER_DIOPTERS: 43.50
OS_K1POWER_DIOPTERS: 42.50
METHOD_AUTO_MANUAL: AUTO
OD_K2POWER_DIOPTERS: 43.25
OD_K1POWER_DIOPTERS: 43.00

## 2025-07-01 ASSESSMENT — CONFRONTATIONAL VISUAL FIELD TEST (CVF)
OS_FINDINGS: FULL
OD_FINDINGS: FULL

## 2025-07-01 ASSESSMENT — LID POSITION - PTOSIS
OS_PTOSIS: LUL 2+ 3+
OD_PTOSIS: RUL 2+ 3+

## 2025-07-01 ASSESSMENT — VISUAL ACUITY
OS_BCVA: 20/25-2
OD_BCVA: 20/25-2

## 2025-07-04 ENCOUNTER — NON-APPOINTMENT (OUTPATIENT)
Age: 80
End: 2025-07-04

## 2025-07-08 ENCOUNTER — OFFICE (OUTPATIENT)
Dept: URBAN - METROPOLITAN AREA CLINIC 12 | Facility: CLINIC | Age: 80
Setting detail: OPHTHALMOLOGY
End: 2025-07-08
Payer: COMMERCIAL

## 2025-07-08 DIAGNOSIS — Z96.1: ICD-10-CM

## 2025-07-08 PROCEDURE — 99024 POSTOP FOLLOW-UP VISIT: CPT | Performed by: OPTOMETRIST

## 2025-07-08 ASSESSMENT — KERATOMETRY
OS_AXISANGLE_DEGREES: 165
OD_K2POWER_DIOPTERS: 43.25
OD_K1POWER_DIOPTERS: 43.25
METHOD_AUTO_MANUAL: AUTO
OD_AXISANGLE_DEGREES: 090
OS_K1POWER_DIOPTERS: 42.75
OS_K2POWER_DIOPTERS: 43.25

## 2025-07-08 ASSESSMENT — REFRACTION_CURRENTRX
OD_OVR_VA: 20/
OS_VPRISM_DIRECTION: PROGS
OS_ADD: +2.50
OD_AXIS: 111
OS_CYLINDER: -0.50
OD_ADD: +2.50
OD_VPRISM_DIRECTION: PROGS
OD_SPHERE: +2.50
OS_OVR_VA: 20/
OD_CYLINDER: -0.25
OS_SPHERE: +2.50
OS_AXIS: 074

## 2025-07-08 ASSESSMENT — CONFRONTATIONAL VISUAL FIELD TEST (CVF)
OD_FINDINGS: FULL
OS_FINDINGS: FULL

## 2025-07-08 ASSESSMENT — VISUAL ACUITY
OD_BCVA: 20/25
OS_BCVA: 20/25-2

## 2025-07-08 ASSESSMENT — LID POSITION - PTOSIS
OD_PTOSIS: RUL 2+ 3+
OS_PTOSIS: LUL 2+ 3+

## 2025-07-08 ASSESSMENT — REFRACTION_AUTOREFRACTION
OS_CYLINDER: -0.50
OS_SPHERE: +0.25
OD_AXIS: 011
OD_SPHERE: PL
OS_AXIS: 074
OD_CYLINDER: -0.25

## 2025-08-05 ENCOUNTER — OFFICE (OUTPATIENT)
Dept: URBAN - METROPOLITAN AREA CLINIC 12 | Facility: CLINIC | Age: 80
Setting detail: OPHTHALMOLOGY
End: 2025-08-05
Payer: COMMERCIAL

## 2025-08-05 DIAGNOSIS — Z96.1: ICD-10-CM

## 2025-08-05 PROCEDURE — 99024 POSTOP FOLLOW-UP VISIT: CPT | Performed by: OPHTHALMOLOGY

## 2025-08-05 ASSESSMENT — REFRACTION_CURRENTRX
OD_ADD: +2.50
OD_SPHERE: +2.50
OS_OVR_VA: 20/
OS_ADD: +2.50
OD_AXIS: 111
OS_CYLINDER: -0.50
OS_SPHERE: +2.50
OS_VPRISM_DIRECTION: PROGS
OD_OVR_VA: 20/
OS_AXIS: 074
OD_VPRISM_DIRECTION: PROGS
OD_CYLINDER: -0.25

## 2025-08-05 ASSESSMENT — CONFRONTATIONAL VISUAL FIELD TEST (CVF)
OD_FINDINGS: FULL
OS_FINDINGS: FULL

## 2025-08-05 ASSESSMENT — REFRACTION_AUTOREFRACTION
OS_AXIS: 085
OD_AXIS: 126
OD_CYLINDER: -0.25
OS_SPHERE: +0.50
OS_CYLINDER: -0.75
OD_SPHERE: +0.25

## 2025-08-05 ASSESSMENT — KERATOMETRY
METHOD_AUTO_MANUAL: AUTO
OD_K1POWER_DIOPTERS: 43.25
OD_AXISANGLE_DEGREES: 090
OD_K2POWER_DIOPTERS: 43.25
OS_K1POWER_DIOPTERS: 43.00
OS_AXISANGLE_DEGREES: 180
OS_K2POWER_DIOPTERS: 43.50

## 2025-08-05 ASSESSMENT — TONOMETRY
OS_IOP_MMHG: 10
OD_IOP_MMHG: 10

## 2025-08-05 ASSESSMENT — SUPERFICIAL PUNCTATE KERATITIS (SPK): OS_SPK: T

## 2025-08-05 ASSESSMENT — VISUAL ACUITY
OS_BCVA: 20/20
OD_BCVA: 20/20-1

## 2025-08-05 ASSESSMENT — LID POSITION - PTOSIS
OD_PTOSIS: RUL 2+ 3+
OS_PTOSIS: LUL 2+ 3+

## 2025-08-12 ENCOUNTER — APPOINTMENT (OUTPATIENT)
Dept: PODIATRY | Facility: CLINIC | Age: 80
End: 2025-08-12
Payer: MEDICARE

## 2025-08-12 PROCEDURE — 11721 DEBRIDE NAIL 6 OR MORE: CPT
